# Patient Record
Sex: MALE | Race: WHITE | NOT HISPANIC OR LATINO | Employment: STUDENT | URBAN - METROPOLITAN AREA
[De-identification: names, ages, dates, MRNs, and addresses within clinical notes are randomized per-mention and may not be internally consistent; named-entity substitution may affect disease eponyms.]

---

## 2017-08-10 ENCOUNTER — ALLIED HEALTH/NURSE VISIT (OUTPATIENT)
Dept: FAMILY MEDICINE | Facility: CLINIC | Age: 32
End: 2017-08-10
Payer: COMMERCIAL

## 2017-08-10 DIAGNOSIS — Z23 ENCOUNTER FOR IMMUNIZATION: Primary | ICD-10-CM

## 2017-08-10 PROCEDURE — 90715 TDAP VACCINE 7 YRS/> IM: CPT

## 2017-08-10 PROCEDURE — 90471 IMMUNIZATION ADMIN: CPT

## 2017-08-10 PROCEDURE — 99207 ZZC NO CHARGE NURSE ONLY: CPT

## 2017-08-10 NOTE — NURSING NOTE
Prior to injection verified patient identity using patient's name and date of birth.  Screening Questionnaire for Adult Immunization    Are you sick today?   No   Do you have allergies to medications, food, a vaccine component or latex?   No   Have you ever had a serious reaction after receiving a vaccination?   No   Do you have a long-term health problem with heart disease, lung disease, asthma, kidney disease, metabolic disease (e.g. diabetes), anemia, or other blood disorder?   No   Do you have cancer, leukemia, HIV/AIDS, or any other immune system problem?   No   In the past 3 months, have you taken medications that affect  your immune system, such as prednisone, other steroids, or anticancer drugs; drugs for the treatment of rheumatoid arthritis, Crohn s disease, or psoriasis; or have you had radiation treatments?   No   Have you had a seizure, or a brain or other nervous system problem?   No   During the past year, have you received a transfusion of blood or blood     products, or been given immune (gamma) globulin or antiviral drug?   No   For women: Are you pregnant or is there a chance you could become        pregnant during the next month?   No   Have you received any vaccinations in the past 4 weeks?   No     Immunization questionnaire answers were all negative.      MNVFC doesn't apply on this patient     Patient instructed to remain in clinic for 15 minutes afterwards, and to report any adverse reaction to me immediately.       Screening performed by Mary Obregon on 8/10/2017 at 8:52 AM.

## 2017-08-10 NOTE — MR AVS SNAPSHOT
"              After Visit Summary   8/10/2017    Jeramie De Jesus    MRN: [id number]           Patient Information     Date Of Birth          1985        Visit Information        Provider Department      8/10/2017 8:30 AM FL GENI BERNABE/LPN [facility]        Today's Diagnoses     Encounter for immunization    -  1       Follow-ups after your visit        Who to contact     If you have questions or need follow up information about today's clinic visit or your schedule please contact [facility] directly at [phone].  Normal or non-critical lab and imaging results will be communicated to you by MyChart, letter or phone within 4 business days after the clinic has received the results. If you do not hear from us within 7 days, please contact the clinic through [employer] or phone. If you have a critical or abnormal lab result, we will notify you by phone as soon as possible.  Submit refill requests through [employer] or call your pharmacy and they will forward the refill request to us. Please allow 3 business days for your refill to be completed.          Additional Information About Your Visit        MyChart Information     [employer] lets you send messages to your doctor, view your test results, renew your prescriptions, schedule appointments and more. To sign up, go to [email or web address] . Click on \"Log in\" on the left side of the screen, which will take you to the Welcome page. Then click on \"Sign up Now\" on the right side of the page.     You will be asked to enter the access code listed below, as well as some personal information. Please follow the directions to create your username and password.     Your access code is: [email or web address]  Expires: 2017  9:00 AM     Your access code will  in 90 days. If you need help or a new code, please call your [facility] or [phone].        Care EveryWhere ID     This is your Care EveryWhere ID. This could be used by other " organizations to access your [facility] medical records  [id number]         Blood Pressure from Last 3 Encounters:   11/02/15 123/79   10/15/15 121/66   10/13/15 124/78    Weight from Last 3 Encounters:   11/02/15 171 lb 9.6 oz (77.8 kg)   10/15/15 165 lb (74.8 kg)   10/13/15 169 lb (76.7 kg)              We Performed the Following     ADMIN 1st VACCINE     TDAP VACCINE (ADACEL)        Primary Care Provider Office Phone # Fax #    Stacy Mckinley [phone] [address] [address]        Equal Access to Services     [facility] CenterAL[facility] : Hadii mari Ferrer, waaxda courtneyadaha, qaybta kaalmada andrew, willis walker . So [facility] [phone].    ATENCIÓN: Si habla español, tiene a pereira disposición servicios gratuitos de asistencia lingüística. Llame al [phone].    We comply with applicable federal civil rights laws and [place] laws. We do not discriminate on the basis of race, color, national origin, age, disability sex, sexual orientation or gender identity.            Thank you!     Thank you for choosing [facility]  for your care. Our goal is always to provide you with excellent care. Hearing back from our patients is one way we can continue to improve our services. Please take a few minutes to complete the written survey that you may receive in the mail after your visit with us. Thank you!             Your Updated Medication List - Protect others around you: Learn how to safely use, store and throw away your medicines at www.disposemymeds.org.          This list is accurate as of: 8/10/17  9:00 AM.  Always use your most recent med list.                   Brand Name Dispense Instructions for use Diagnosis    azithromycin 250 MG tablet    ZITHROMAX    6 tablet    Two tablets first day, then one tablet daily for four days.

## 2017-11-19 ENCOUNTER — OFFICE VISIT (OUTPATIENT)
Dept: URGENT CARE | Facility: URGENT CARE | Age: 32
End: 2017-11-19
Payer: OTHER MISCELLANEOUS

## 2017-11-19 VITALS
BODY MASS INDEX: 27.28 KG/M2 | OXYGEN SATURATION: 98 % | SYSTOLIC BLOOD PRESSURE: 127 MMHG | WEIGHT: 179.4 LBS | HEART RATE: 81 BPM | DIASTOLIC BLOOD PRESSURE: 67 MMHG | TEMPERATURE: 97.9 F

## 2017-11-19 DIAGNOSIS — T14.8XXA SUPERFICIAL FOREIGN BODY (SLIVER): Primary | ICD-10-CM

## 2017-11-19 PROCEDURE — 99213 OFFICE O/P EST LOW 20 MIN: CPT | Performed by: FAMILY MEDICINE

## 2017-11-19 RX ORDER — CEPHALEXIN 500 MG/1
500 CAPSULE ORAL 4 TIMES DAILY
Qty: 40 CAPSULE | Refills: 0 | Status: SHIPPED | OUTPATIENT
Start: 2017-11-19 | End: 2021-08-12

## 2017-11-19 NOTE — PROGRESS NOTES
SUBJECTIVE:   Jeramie De Jesus is a 31 year old male who presents to clinic today for the following health issues:      Sliver removal       Duration: this morning at 6:20 am     Description (location/character/radiation): sliver is on right calf     Intensity:  mild    Accompanying signs and symptoms: walked along side a plywood sheet and got too close, sliver is about an inch or so at least     History (similar episodes/previous evaluation): None    Precipitating or alleviating factors: None    Therapies tried and outcome: cleaned, tired to pull it out himself, antibiotic ointment and bandaid     Tetanus vaccination is up to date          Problem list and histories reviewed & adjusted, as indicated.  Additional history: as documented    There is no problem list on file for this patient.    History reviewed. No pertinent surgical history.    Social History   Substance Use Topics     Smoking status: Never Smoker     Smokeless tobacco: Never Used     Alcohol use Yes      Comment: Socially     Family History   Problem Relation Age of Onset     Hypertension Father          No current outpatient prescriptions on file.     No Known Allergies  No lab results found.   BP Readings from Last 3 Encounters:   11/19/17 127/67   11/02/15 123/79   10/15/15 121/66    Wt Readings from Last 3 Encounters:   11/19/17 179 lb 6.4 oz (81.4 kg)   11/02/15 171 lb 9.6 oz (77.8 kg)   10/15/15 165 lb (74.8 kg)                  Labs reviewed in EPIC          Reviewed and updated as needed this visit by clinical staffTobacco  Allergies  Meds  Med Hx  Surg Hx  Fam Hx  Soc Hx      Reviewed and updated as needed this visit by Provider         ROS:  Constitutional, HEENT, cardiovascular, pulmonary, gi and gu systems are negative, except as otherwise noted.      OBJECTIVE:   /67  Pulse 81  Temp 97.9  F (36.6  C) (Tympanic)  Wt 179 lb 6.4 oz (81.4 kg)  SpO2 98%  BMI 27.28 kg/m2  Body mass index is 27.28 kg/(m^2).  GENERAL: healthy,  alert and no distress  EYES: Eyes grossly normal to inspection, PERRL and conjunctivae and sclerae normal  NECK: no adenopathy, no asymmetry, masses, or scars and thyroid normal to palpation  RESP: lungs clear to auscultation - no rales, rhonchi or wheezes  CV: regular rate and rhythm, normal S1 S2, no S3 or S4, no murmur, click or rub, no peripheral edema and peripheral pulses strong  ABDOMEN: soft, nontender, no hepatosplenomegaly, no masses and bowel sounds normal  SKIN: pencil dot size skin ulceration involving right lateral calf as below, no foreign body identified, tender on palpation, no swelling or drainage noted, sliver forcep used for probing the ulceration, pulses 3+, sensation to touch and pressure intact             ASSESSMENT/PLAN:         ICD-10-CM    1. Superficial foreign body (sliver) T14.8XXA cephALEXin (KEFLEX) 500 MG capsule     ORTHOPEDICS ADULT REFERRAL     No foreign body identifiable, sliver forcep used for probing the ulceration. Suggested to use cephalexin prophylactically for infection prevention. Patient seems concern with the amount of pain which he is experiencing. Suggested to follow up with orthopedics if symptom persist or worsen. Instructed to keep the area dry and clean, used bacitracin topically as well. Work noted provided. Patient understood and in agreement with the above plan. All questions answered.         Trae Goodwin MD  [facility]

## 2017-11-19 NOTE — LETTER
November 19, 2017      Jeramie De Jesus  [address] [place] [address]        To Whom It May Concern:          Jeramie De Jesus was seen in our clinic. He may return to work without restrictions today.          Sincerely,              Trae Goodwin MD

## 2017-11-19 NOTE — MR AVS SNAPSHOT
After Visit Summary   11/19/2017    Jeramie De Jesus    MRN: [id number]           Patient Information     Date Of Birth          1985        Visit Information        Provider Department      11/19/2017 11:00 AM Trae Goodwin MD [facility]        Today's Diagnoses     Superficial foreign body (sliver)    -  1       Follow-ups after your visit        Additional Services     ORTHOPEDICS ADULT REFERRAL       Your provider has referred you to: [facility] [place] [phone]   [email or web address]    Please be aware that coverage of these services is subject to the terms and limitations of your health insurance plan.  Call member services at your health plan with any benefit or coverage questions.      Please bring the following to your appointment:    >>   Any x-rays, CTs or MRIs which have been performed.  Contact the facility where they were done to arrange for  prior to your scheduled appointment.    >>   List of current medications   >>   This referral request   >>   Any documents/labs given to you for this referral                  Who to contact     If you have questions or need follow up information about today's clinic visit or your schedule please contact [facility] directly at [phone].  Normal or non-critical lab and imaging results will be communicated to you by MyChart, letter or phone within 4 business days after the clinic has received the results. If you do not hear from us within 7 days, please contact the clinic through MyChart or phone. If you have a critical or abnormal lab result, we will notify you by phone as soon as possible.  Submit refill requests through [employer] or call your pharmacy and they will forward the refill request to us. Please allow 3 business days for your refill to be completed.          Additional Information About Your  "Visit        Prior Knowledge Information     Prior Knowledge lets you send messages to your doctor, view your test results, renew your prescriptions, schedule appointments and more. To sign up, go to [email or web address] . Click on \"Log in\" on the left side of the screen, which will take you to the Welcome page. Then click on \"Sign up Now\" on the right side of the page.     You will be asked to enter the access code listed below, as well as some personal information. Please follow the directions to create your username and password.     Your access code is: [email or web address]  Expires: 2018 11:47 AM     Your access code will  in 90 days. If you need help or a new code, please call your [facility] clinic or [phone].        Care EveryWhere ID     This is your Care EveryWhere ID. This could be used by other organizations to access your [facility] medical records  [id number]        Your Vitals Were     Pulse Temperature Pulse Oximetry BMI (Body Mass Index)          81 97.9  F (36.6  C) (Tympanic) 98% 27.28 kg/m2         Blood Pressure from Last 3 Encounters:   17 127/67   11/02/15 123/79   10/15/15 121/66    Weight from Last 3 Encounters:   17 179 lb 6.4 oz (81.4 kg)   11/02/15 171 lb 9.6 oz (77.8 kg)   10/15/15 165 lb (74.8 kg)              We Performed the Following     ORTHOPEDICS ADULT REFERRAL          Today's Medication Changes          These changes are accurate as of: 17 11:48 AM.  If you have any questions, ask your nurse or doctor.               Start taking these medicines.        Dose/Directions    cephALEXin 500 MG capsule   Commonly known as:  KEFLEX   Used for:  Superficial foreign body (sliver)   Started by:  Trae Goodwin MD        Dose:  500 mg   Take 1 capsule (500 mg) by mouth 4 times daily   Quantity:  40 capsule   Refills:  0            Where to get your medicines      These medications were sent to [id number] - [facility] [address] " [facility] [address]    Hours:  Closed 10-16-08 business to [facility] Phone:  [phone]     cephALEXin 500 MG capsule                Primary Care Provider Office Phone # Fax #    Stacy Mckinley [phone] [address] [facility] [address]        Equal Access to Services     JORDANA CONLEY : Hadii aad ku hadasho Soomaali, waaxda luqadaha, qaybta kaalmada adeegyada, waxay idiin hayaan adeeg kharash laJennyaan . So [facility] [phone].    ATENCIÓN: Si habla español, tiene a pereira disposición servicios gratuitos de asistencia lingüística. Llame al [phone].    We comply with applicable federal civil rights laws and [place] laws. We do not discriminate on the basis of race, color, national origin, age, disability, sex, sexual orientation, or gender identity.            Thank you!     Thank you for choosing [facility]  for your care. Our goal is always to provide you with excellent care. Hearing back from our patients is one way we can continue to improve our services. Please take a few minutes to complete the written survey that you may receive in the mail after your visit with us. Thank you!             Your Updated Medication List - Protect others around you: Learn how to safely use, store and throw away your medicines at www.disposemymeds.org.          This list is accurate as of: 11/19/17 11:48 AM.  Always use your most recent med list.                   Brand Name Dispense Instructions for use Diagnosis    cephALEXin 500 MG capsule    KEFLEX    40 capsule    Take 1 capsule (500 mg) by mouth 4 times daily    Superficial foreign body (sliver)

## 2017-12-07 ENCOUNTER — OFFICE VISIT (OUTPATIENT)
Dept: FAMILY MEDICINE | Facility: CLINIC | Age: 32
End: 2017-12-07
Payer: OTHER MISCELLANEOUS

## 2017-12-07 ENCOUNTER — OFFICE VISIT (OUTPATIENT)
Dept: ORTHOPEDICS | Facility: CLINIC | Age: 32
End: 2017-12-07
Payer: OTHER MISCELLANEOUS

## 2017-12-07 VITALS
TEMPERATURE: 97.5 F | HEART RATE: 80 BPM | HEIGHT: 69 IN | SYSTOLIC BLOOD PRESSURE: 117 MMHG | BODY MASS INDEX: 26.07 KG/M2 | WEIGHT: 176 LBS | DIASTOLIC BLOOD PRESSURE: 78 MMHG

## 2017-12-07 VITALS — RESPIRATION RATE: 18 BRPM | BODY MASS INDEX: 26.51 KG/M2 | WEIGHT: 179 LBS | HEIGHT: 69 IN | TEMPERATURE: 98.2 F

## 2017-12-07 DIAGNOSIS — M79.5 FOREIGN BODY (FB) IN SOFT TISSUE: Primary | ICD-10-CM

## 2017-12-07 PROCEDURE — 99243 OFF/OP CNSLTJ NEW/EST LOW 30: CPT | Performed by: ORTHOPAEDIC SURGERY

## 2017-12-07 PROCEDURE — 99213 OFFICE O/P EST LOW 20 MIN: CPT | Performed by: FAMILY MEDICINE

## 2017-12-07 NOTE — PROGRESS NOTES
SUBJECTIVE:   Jeramie De Jesus is a 31 year old male who presents to clinic today for the following health issues:  Work comp    Concern - sliver R leg calf   Onset: 11-19-17    Description:   Patient was walking between a pallet and some  Wood it was a very narrow space and got a sliver from the wood, was been seen but nothing was taking out from calf of leg     Intensity: moderate    Progression of Symptoms:  same    Accompanying Signs & Symptoms:  Sliver from a piece of wood     Previous history of similar problem:   None     Precipitating factors:   Worsened by: walking and up on his feet     Alleviating factors:  Improved by: none     Therapies Tried and outcome: Patient was seen on 11-19 put noting was seen       Patient is a 31 yr old male here for foreign body removal. This happened at work over two weeks ago and he was seen in  and there was an attempt to remove this . Pain is experiencing some pain in his calf. Patient denies any other symptoms.     Problem list and histories reviewed & adjusted, as indicated.  Additional history: as documented    There is no problem list on file for this patient.    History reviewed. No pertinent surgical history.    Social History   Substance Use Topics     Smoking status: Never Smoker     Smokeless tobacco: Never Used     Alcohol use Yes      Comment: Socially     Family History   Problem Relation Age of Onset     Hypertension Father          Current Outpatient Prescriptions   Medication Sig Dispense Refill     cephALEXin (KEFLEX) 500 MG capsule Take 1 capsule (500 mg) by mouth 4 times daily 40 capsule 0     No Known Allergies  BP Readings from Last 3 Encounters:   12/07/17 117/78   11/19/17 127/67   11/02/15 123/79    Wt Readings from Last 3 Encounters:   12/07/17 176 lb (79.8 kg)   11/19/17 179 lb 6.4 oz (81.4 kg)   11/02/15 171 lb 9.6 oz (77.8 kg)                  Labs reviewed in EPIC        Reviewed and updated as needed this visit by clinical staffTobacco   "Allergies  Med Hx  Surg Hx  Fam Hx  Soc Hx      Reviewed and updated as needed this visit by Provider         ROS:  Constitutional, HEENT, cardiovascular, pulmonary, gi and gu systems are negative, except as otherwise noted.      OBJECTIVE:   /78 (BP Location: Left arm, Cuff Size: Adult Regular)  Pulse 80  Temp 97.5  F (36.4  C) (Tympanic)  Ht 5' 9\" (1.753 m)  Wt 176 lb (79.8 kg)  BMI 25.99 kg/m2  Body mass index is 25.99 kg/(m^2).  GENERAL: healthy, alert and no distress  EYES: Eyes grossly normal to inspection, PERRL and conjunctivae and sclerae normal  HENT: ear canals and TM's normal, nose and mouth without ulcers or lesions  NECK: no adenopathy, no asymmetry, masses, or scars and thyroid normal to palpation  RESP: lungs clear to auscultation - no rales, rhonchi or wheezes  CV: regular rate and rhythm, normal S1 S2, no S3 or S4, no murmur, click or rub, no peripheral edema and peripheral pulses strong  MS: no gross musculoskeletal defects noted, no edema  SKIN: no suspicious lesions or rashes and foreign body in skin on calf, deep in soft tissue  .    Diagnostic Test Results:  none     ASSESSMENT/PLAN:         1. Foreign body (FB) in soft tissue  Patient referred to orthopedics for removal. Since foreign body is deep in the tissue don't feel comfortable removing it   - ORTHO  REFERRAL    FUTURE APPOINTMENTS:       - Follow-up visit as needed    Kayla Ye MD  [facility]  "

## 2017-12-07 NOTE — NURSING NOTE
"Chief Complaint   Patient presents with     Consult     Right calf sliver on 11/19/17 at work. Pain level 0/10.       Initial Temp 98.2  F (36.8  C)  Resp 18  Ht 1.753 m (5' 9\")  Wt 81.2 kg (179 lb)  BMI 26.43 kg/m2 Estimated body mass index is 26.43 kg/(m^2) as calculated from the following:    Height as of this encounter: 1.753 m (5' 9\").    Weight as of this encounter: 81.2 kg (179 lb).  Medication Reconciliation: complete   Anh Johnson MA      "

## 2017-12-07 NOTE — NURSING NOTE
"Chief Complaint   Patient presents with     Foreign Body in Skin     R leg back of calf        Initial /78 (BP Location: Left arm, Cuff Size: Adult Regular)  Pulse 80  Temp 97.5  F (36.4  C) (Tympanic)  Ht 5' 9\" (1.753 m)  Wt 176 lb (79.8 kg)  BMI 25.99 kg/m2 Estimated body mass index is 25.99 kg/(m^2) as calculated from the following:    Height as of this encounter: 5' 9\" (1.753 m).    Weight as of this encounter: 176 lb (79.8 kg).  Medication Reconciliation: complete  "

## 2017-12-07 NOTE — MR AVS SNAPSHOT
After Visit Summary   12/7/2017    Jeramie De Jesus    MRN: [id number]           Patient Information     Date Of Birth          1985        Visit Information        Provider Department      12/7/2017 11:00 AM Kayla Ye MD [facility]        Today's Diagnoses     Foreign body (FB) in soft tissue    -  1      Care Instructions          Thank you for choosing [facility].  You may be receiving a survey in the mail from [employer] regarding your visit today.  Please take a few minutes to complete and return the survey to let us know how we are doing.      If you have questions or concerns, please contact us via Ecommo or you can contact your care team at [phone].    Our Clinic hours are:  Monday 6:40 am  to 7:00 pm  Tuesday -Friday 6:40 am to 5:00 pm    The [place] outpatient lab hours are:  Monday - Friday 6:10 am to 4:45 pm  Saturdays 7:00 am to 11:00 am  Appointments are required, call [phone]    If you have clinical questions after hours or would like to schedule an appointment,  call the clinic at [phone].          Follow-ups after your visit        Additional Services     ORTHO  REFERRAL       [facility] is referring you to the Orthopedic  Services at [facility].       The  Representative will assist you in the coordination of your Orthopedic and Musculoskeletal Care as prescribed by your physician.    The  Representative will call you within 1 business day to help schedule your appointment, or you may contact the  Representative at:    All areas ~ [phone]     Type of Referral : Surgical / Specialist       Timeframe requested: 1 - 2 days    Coverage of these services is subject to the terms and limitations of your health insurance plan.  Please call member services at your health plan with any benefit or coverage questions.      If X-rays, CT or MRI's  "have been performed, please contact the facility where they were done to arrange for , prior to your scheduled appointment.  Please bring this referral request to your appointment and present it to your specialist.                  Your next 10 appointments already scheduled     Dec 07, 2017  2:45 PM CST   New Visit with Hugh Flores MD   [facility]    [address]  [place] [address] [phone]              Who to contact     If you have questions or need follow up information about today's clinic visit or your schedule please contact [facility] directly at [phone].  Normal or non-critical lab and imaging results will be communicated to you by MyChart, letter or phone within 4 business days after the clinic has received the results. If you do not hear from us within 7 days, please contact the clinic through MyChart or phone. If you have a critical or abnormal lab result, we will notify you by phone as soon as possible.  Submit refill requests through nDreams or call your pharmacy and they will forward the refill request to us. Please allow 3 business days for your refill to be completed.          Additional Information About Your Visit        [employer] Information     nDreams lets you send messages to your doctor, view your test results, renew your prescriptions, schedule appointments and more. To sign up, go to [email or web address] . Click on \"Log in\" on the left side of the screen, which will take you to the Welcome page. Then click on \"Sign up Now\" on the right side of the page.     You will be asked to enter the access code listed below, as well as some personal information. Please follow the directions to create your username and password.     Your access code is: [email or web address]  Expires: 2018 11:47 AM     Your access code will  in 90 days. If you need help or a new code, please call your [facility] or " "[phone].        Care EveryWhere ID     This is your Care EveryWhere ID. This could be used by other organizations to access your [place] medical records  [id number]        Your Vitals Were     Pulse Temperature Height BMI (Body Mass Index)          80 97.5  F (36.4  C) (Tympanic) 5' 9\" (1.753 m) 25.99 kg/m2         Blood Pressure from Last 3 Encounters:   12/07/17 117/78   11/19/17 127/67   11/02/15 123/79    Weight from Last 3 Encounters:   12/07/17 176 lb (79.8 kg)   11/19/17 179 lb 6.4 oz (81.4 kg)   11/02/15 171 lb 9.6 oz (77.8 kg)              We Performed the Following     ORTHO  REFERRAL        Primary Care Provider Office Phone # Fax #    Stacy Mckinley [phone] [address] [place] [address]        Equal Access to Services     JORDANA CONLEY : Hadii aad ku hadasho Soomaali, waaxda luqadaha, qaybta kaalmada adeegyada, waxay melissain hayolga walker . So [facility] [phone].    ATENCIÓN: Si habla español, tiene a pereira disposición servicios gratuitos de asistencia lingüística. [facility] [phone].    We comply with applicable federal civil rights laws and [place] laws. We do not discriminate on the basis of race, color, national origin, age, disability, sex, sexual orientation, or gender identity.            Thank you!     Thank you for choosing [facility]  for your care. Our goal is always to provide you with excellent care. Hearing back from our patients is one way we can continue to improve our services. Please take a few minutes to complete the written survey that you may receive in the mail after your visit with us. Thank you!             Your Updated Medication List - Protect others around you: Learn how to safely use, store and throw away your medicines at www.disposemymeds.org.          This list is accurate as of: 12/7/17 12:43 PM.  Always use your most recent med list.                   Brand Name Dispense Instructions for " use Diagnosis    cephALEXin 500 MG capsule    KEFLEX    40 capsule    Take 1 capsule (500 mg) by mouth 4 times daily    Superficial foreign body (sliver)

## 2017-12-07 NOTE — PATIENT INSTRUCTIONS
Thank you for choosing [facility].  You may be receiving a survey in the mail from Devan Kelsey regarding your visit today.  Please take a few minutes to complete and return the survey to let us know how we are doing.      If you have questions or concerns, please contact us via MugenUp or you can contact your care team at [phone].    Our Clinic hours are:  Monday 6:40 am  to 7:00 pm  Tuesday -Friday 6:40 am to 5:00 pm    The [place] outpatient lab hours are:  Monday - Friday 6:10 am to 4:45 pm  Saturdays 7:00 am to 11:00 am  Appointments are required, call [phone]    If you have clinical questions after hours or would like to schedule an appointment,  call the clinic at [phone].

## 2017-12-07 NOTE — LETTER
12/7/2017         RE: Jeramie De Jesus  [address] [address]        Dear Colleague,    Thank you for referring your patient, Jeramie De Jesus, to the [facility]. Please see a copy of my visit note below.    Jeramie De Jesus is a 31 year old male who is seen in consultation at the request of Dr Kayla Ye for foreign body in right calf.    History reviewed. No pertinent past medical history.    History reviewed. No pertinent surgical history.    Family History   Problem Relation Age of Onset     Hypertension Father        Social History     Social History     Marital status:      Spouse name: N/A     Number of children: N/A     Years of education: N/A     Occupational History     Not on file.     Social History Main Topics     Smoking status: Never Smoker     Smokeless tobacco: Never Used     Alcohol use Yes      Comment: Socially     Drug use: No     Sexual activity: Yes     Partners: Female     Other Topics Concern     Not on file     Social History Narrative       Current Outpatient Prescriptions   Medication Sig Dispense Refill     cephALEXin (KEFLEX) 500 MG capsule Take 1 capsule (500 mg) by mouth 4 times daily 40 capsule 0       No Known Allergies    REVIEW OF SYSTEMS:  CONSTITUTIONAL:  NEGATIVE for fever, chills, change in weight, not feeling tired  SKIN:  NEGATIVE for worrisome rashes, no skin lumps, no skin ulcers and no non-healing wounds  EYES:  NEGATIVE for vision changes or irritation.  ENT/MOUTH:  NEGATIVE.  No hearing loss, no hoarseness, no difficulty swallowing.  RESP:  NEGATIVE. No cough or shortness of breath.  CV:  NEGATIVE for chest pain, palpitations or peripheral edema  GI:  NEGATIVE for nausea, abdominal pain, heartburn, or change in bowel habits  :  Negative. No dysuria, no hematuria  MUSCULOSKELETAL:  See HPI above  NEURO:  NEGATIVE . No headaches, no dizziness,  no numbness  ENDOCRINE:  NEGATIVE for temperature intolerance, skin/hair  "changes  HEME/ALLERGY/IMMUNE:  NEGATIVE for bleeding problems  PSYCHIATRIC:  NEGATIVE. no anxiety, no depression.     Exam:  Vitals: Temp 98.2  F (36.8  C)  Resp 18  Ht 1.753 m (5' 9\")  Wt 81.2 kg (179 lb)  BMI 26.43 kg/m2  BMI= Body mass index is 26.43 kg/(m^2).  Constitutional:  healthy, alert and no distress  Neuro: Alert and Oriented x 3, Gait normal. Sensation grossly WNL.  Psych: Affect normal   Respiratory: Breathing not labored.  Cardiovascular: normal peripheral pulses  Lymph: no adenopathy  Skin: No rashes,worrisome lesions or skin problems      Again, thank you for allowing me to participate in the care of your patient.        Sincerely,        Hugh Flores MD    "

## 2017-12-07 NOTE — MR AVS SNAPSHOT
"              After Visit Summary   2017    Jeramie De Jesus    MRN: [id number]           Patient Information     Date Of Birth          1985        Visit Information        Provider Department      2017 2:45 PM Hugh Flores MD [facility] [place]        Today's Diagnoses     Foreign body (FB) in soft tissue    -  1       Follow-ups after your visit        Who to contact     If you have questions or need follow up information about today's clinic visit or your schedule please contact [facility] directly at [phone].  Normal or non-critical lab and imaging results will be communicated to you by Power Electronicshart, letter or phone within 4 business days after the clinic has received the results. If you do not hear from us within 7 days, please contact the clinic through Powerlinxt or phone. If you have a critical or abnormal lab result, we will notify you by phone as soon as possible.  Submit refill requests through [employer] Media or call your pharmacy and they will forward the refill request to us. Please allow 3 business days for your refill to be completed.          Additional Information About Your Visit        MyChart Information     [employer] lets you send messages to your doctor, view your test results, renew your prescriptions, schedule appointments and more. To sign up, go to [email or web address] . Click on \"Log in\" on the left side of the screen, which will take you to the Welcome page. Then click on \"Sign up Now\" on the right side of the page.     You will be asked to enter the access code listed below, as well as some personal information. Please follow the directions to create your username and password.     Your access code is: [email or web address]  Expires: 2018 11:47 AM     Your access code will  in 90 days. If you need help or a new code, please call your [facility] or [phone].        Care EveryWhere ID     This is your Care EveryWhere ID. This could be used by " "other organizations to access your [place] medical records  [id number]        Your Vitals Were     Temperature Respirations Height BMI (Body Mass Index)          98.2  F (36.8  C) 18 1.753 m (5' 9\") 26.43 kg/m2         Blood Pressure from Last 3 Encounters:   12/07/17 117/78   11/19/17 127/67   11/02/15 123/79    Weight from Last 3 Encounters:   12/07/17 81.2 kg (179 lb)   12/07/17 79.8 kg (176 lb)   11/19/17 81.4 kg (179 lb 6.4 oz)              Today, you had the following     No orders found for display       Primary Care Provider Office Phone # Fax #    Stacy Mckinley [phone] [address] [place] [address]        Equal Access to Services     [facility] : Hadii mari guerrero hadasho Somaggie, waaxda luqadaha, qaybta kaalmada andrew, willis walker . So [facility] [phone].    ATENCIÓN: Si habla español, tiene a pereira disposición servicios gratuitos de asistencia lingüística. [facility] al [phone].    We comply with applicable federal civil rights laws and [place] laws. We do not discriminate on the basis of race, color, national origin, age, disability, sex, sexual orientation, or gender identity.            Thank you!     Thank you for choosing [facility]  for your care. Our goal is always to provide you with excellent care. Hearing back from our patients is one way we can continue to improve our services. Please take a few minutes to complete the written survey that you may receive in the mail after your visit with us. Thank you!             Your Updated Medication List - Protect others around you: Learn how to safely use, store and throw away your medicines at www.disposemymeds.org.          This list is accurate as of: 12/7/17  8:45 PM.  Always use your most recent med list.                   Brand Name Dispense Instructions for use Diagnosis    cephALEXin 500 MG capsule    KEFLEX    40 capsule    Take 1 capsule (500 mg) by mouth 4 " times daily    Superficial foreign body (sliver)

## 2017-12-08 NOTE — PROGRESS NOTES
DATE OF VISIT:  2017.       HISTORY OF PRESENT ILLNESS:  Mr. Robbie De Jesus is a 31-year-old male referred from Dr. Kayla Ye for evaluation of foreign body in right calf.  He was injured at work on 2017 while walking between a pallet and some wood.  It was a very narrow space and he got a sliver of wood in the calf.  He pulled part of it out, but only part came out.  He was seen after that, but nothing was taken out of the leg.  He has had persistence of pain in the calf and pain when walking up on his feet.  If he bumps this it is quite sore.  He can feel a transverse foreign body in the superficial calf.  He has no pain unless it is bumped or with any activity with walking.      PHYSICAL EXAMINATION:  Shows a small puncture wound at the lateral aspect of the calf on the right at the posterolateral aspect.  Just posterior to this about a centimeter, there is a prominence of foreign body.  It appears to be a linear sliver oriented transversely and angled posteriorly and medially from the entrance point.  It may be about an inch to an inch and a half long.  I cannot feel the far edge of it at its depth.  Sensation and circulation are intact.  There is no surrounding erythema or drainage.      IMPRESSION:  Foreign body, right calf, work-related.       We discussed options and I have recommended surgical excision of this because of the tenderness.  We should be able to do this with a local block in clinic.  We would need work compensation approval first and then will plan excision with local block with anesthetic with epinephrine.  I explained there is a risk that not all of the foreign body would be removed.         RAEGAN ROSS MD             D: 2017 20:43   T: 2017 09:12   MT: DANIELITO      Name:     ROBBIE DE JESUS   MRN:      [id number]        Account:      [id number]   :      1985           Visit Date:   2017      Document: [id number]

## 2017-12-08 NOTE — PROGRESS NOTES
"Jeramie De Jesus is a 31 year old male who is seen in consultation at the request of Dr Kayla Ye for foreign body in right calf.    History reviewed. No pertinent past medical history.    History reviewed. No pertinent surgical history.    Family History   Problem Relation Age of Onset     Hypertension Father        Social History     Social History     Marital status:      Spouse name: N/A     Number of children: N/A     Years of education: N/A     Occupational History     Not on file.     Social History Main Topics     Smoking status: Never Smoker     Smokeless tobacco: Never Used     Alcohol use Yes      Comment: Socially     Drug use: No     Sexual activity: Yes     Partners: Female     Other Topics Concern     Not on file     Social History Narrative       Current Outpatient Prescriptions   Medication Sig Dispense Refill     cephALEXin (KEFLEX) 500 MG capsule Take 1 capsule (500 mg) by mouth 4 times daily 40 capsule 0       No Known Allergies    REVIEW OF SYSTEMS:  CONSTITUTIONAL:  NEGATIVE for fever, chills, change in weight, not feeling tired  SKIN:  NEGATIVE for worrisome rashes, no skin lumps, no skin ulcers and no non-healing wounds  EYES:  NEGATIVE for vision changes or irritation.  ENT/MOUTH:  NEGATIVE.  No hearing loss, no hoarseness, no difficulty swallowing.  RESP:  NEGATIVE. No cough or shortness of breath.  CV:  NEGATIVE for chest pain, palpitations or peripheral edema  GI:  NEGATIVE for nausea, abdominal pain, heartburn, or change in bowel habits  :  Negative. No dysuria, no hematuria  MUSCULOSKELETAL:  See HPI above  NEURO:  NEGATIVE . No headaches, no dizziness,  no numbness  ENDOCRINE:  NEGATIVE for temperature intolerance, skin/hair changes  HEME/ALLERGY/IMMUNE:  NEGATIVE for bleeding problems  PSYCHIATRIC:  NEGATIVE. no anxiety, no depression.     Exam:  Vitals: Temp 98.2  F (36.8  C)  Resp 18  Ht 1.753 m (5' 9\")  Wt 81.2 kg (179 lb)  BMI 26.43 kg/m2  BMI= Body mass index " is 26.43 kg/(m^2).  Constitutional:  healthy, alert and no distress  Neuro: Alert and Oriented x 3, Gait normal. Sensation grossly WNL.  Psych: Affect normal   Respiratory: Breathing not labored.  Cardiovascular: normal peripheral pulses  Lymph: no adenopathy  Skin: No rashes,worrisome lesions or skin problems

## 2017-12-22 ENCOUNTER — TELEPHONE (OUTPATIENT)
Dept: ORTHOPEDICS | Facility: CLINIC | Age: 32
End: 2017-12-22

## 2017-12-22 NOTE — TELEPHONE ENCOUNTER
Tried calling Jeramie to inform him that his procedure has been approved by workers compensation but was unable to reach him and I did not leave a message. I will try calling him again when I return to clinic on 1/2/18. If he calls back before that time he may schedule this at the [facility] at 4:00PM on the following dates: 1/8/18, 1/15/18 or 1/18/18 (3:45PM).    MARLENE MaynardC  Supervising physician: Hugh Flores MD  Dept. of Orthopedics  [facility]

## 2018-01-02 ENCOUNTER — DOCUMENTATION ONLY (OUTPATIENT)
Dept: ORTHOPEDICS | Facility: CLINIC | Age: 33
End: 2018-01-02

## 2018-01-02 NOTE — PROGRESS NOTES
Called and left message with patient apologizing for the mix up, we were very clear in the last message to the patient and still someone scheduled him wrong, so I let him know we cannot preform his procedure at 10:30 on 1/8/17 in [place] that it has to be done after clinic hours so I told him to show up at 3:45 same day in [place]. Patient is very difficult to reach.    Anh Johnson MA

## 2018-01-08 ENCOUNTER — OFFICE VISIT (OUTPATIENT)
Dept: ORTHOPEDICS | Facility: CLINIC | Age: 33
End: 2018-01-08
Payer: OTHER MISCELLANEOUS

## 2018-01-08 VITALS — RESPIRATION RATE: 13 BRPM | WEIGHT: 181 LBS | BODY MASS INDEX: 27.43 KG/M2 | HEIGHT: 68 IN

## 2018-01-08 DIAGNOSIS — M79.5 FOREIGN BODY (FB) IN SOFT TISSUE: Primary | ICD-10-CM

## 2018-01-08 PROCEDURE — 10120 INC&RMVL FB SUBQ TISS SMPL: CPT | Performed by: ORTHOPAEDIC SURGERY

## 2018-01-08 NOTE — PATIENT INSTRUCTIONS
- Keep the leg clean and dry until we remove the stitches in clinic (10-14 days).  If the dressing gets a bit wet, it should be fine.  If the entire dressing gets soaked, remove it and cover it up with a new, dry dressing as soon as you are able.       - You may remove the bulky dressing/gauze in 2-3 days, then cover the stitches with a band-aid to keep them clean and dry.    - Make an appointment for 10-14 days after the procedure for stitch removal.  You may make this with the  staff or call [phone] to make the follow up appointment.  We can remove the stitches at any one of the three clinics Dr. Hugh Flores visits during the week.    - If you have symptoms of redness around the wound, drainage, increased warmth, fevers, chills or suspect infection call Dr. Hugh lFores's office.  Infections are rare, but do occur and usually are able to be treated with an antibiotic called in to your pharmacy.    - Call [phone] with any questions or concerns, please remember we are NOT in clinic Wednesday or Friday and may be unavailable these two days.

## 2018-01-08 NOTE — MR AVS SNAPSHOT
After Visit Summary   1/8/2018    Jeramie De Jesus    MRN: [id number]           Patient Information     Date Of Birth          1985        Visit Information        Provider Department      1/8/2018 10:30 AM Hugh Flores MD [facility] [place]        Care Instructions    - Keep the leg clean and dry until we remove the stitches in clinic (10-14 days).  If the dressing gets a bit wet, it should be fine.  If the entire dressing gets soaked, remove it and cover it up with a new, dry dressing as soon as you are able.       - You may remove the bulky dressing/gauze in 2-3 days, then cover the stitches with a band-aid to keep them clean and dry.    - Make an appointment for 10-14 days after the procedure for stitch removal.  You may make this with the  staff or call [phone] to make the follow up appointment.  We can remove the stitches at any one of the three clinics Dr. Hugh Flores visits during the week.    - If you have symptoms of redness around the wound, drainage, increased warmth, fevers, chills or suspect infection call Dr. Hugh Flores's office.  Infections are rare, but do occur and usually are able to be treated with an antibiotic called in to your pharmacy.    - Call [phone] with any questions or concerns, please remember we are NOT in clinic Wednesday or Friday and may be unavailable these two days.            Follow-ups after your visit        Your next 10 appointments already scheduled     Jan 18, 2018  2:00 PM CST   Return Visit with Hugh Flores MD   [facility] [place] ([facility] [place]    [address]  [place] [id number] [phone]              Who to contact     If you have questions or need follow up information about today's clinic visit or your schedule please contact [facility] [place] directly at [phone].  Normal or non-critical lab and imaging results will be communicated to you by  "MyChart, letter or phone within 4 business days after the clinic has received the results. If you do not hear from us within 7 days, please contact the clinic through [employer] or phone. If you have a critical or abnormal lab result, we will notify you by phone as soon as possible.  Submit refill requests through [employer] or call your pharmacy and they will forward the refill request to us. Please allow 3 business days for your refill to be completed.          Additional Information About Your Visit        [employer]Ohloh Information     [employer] lets you send messages to your doctor, view your test results, renew your prescriptions, schedule appointments and more. To sign up, go to [email or web address] . Click on \"Log in\" on the left side of the screen, which will take you to the Welcome page. Then click on \"Sign up Now\" on the right side of the page.     You will be asked to enter the access code listed below, as well as some personal information. Please follow the directions to create your username and password.     Your access code is: [email or web address]  Expires: 2018 11:47 AM     Your access code will  in 90 days. If you need help or a new code, please call your [place] clinic or [phone].        Care EveryWhere ID     This is your Care EveryWhere ID. This could be used by other organizations to access your [place] medical records  [id number]        Your Vitals Were     Respirations Height BMI (Body Mass Index)             13 1.727 m (5' 8\") 27.52 kg/m2          Blood Pressure from Last 3 Encounters:   17 117/78   17 127/67   11/02/15 123/79    Weight from Last 3 Encounters:   18 82.1 kg (181 lb)   17 81.2 kg (179 lb)   17 79.8 kg (176 lb)              Today, you had the following     No orders found for display       Primary Care Provider Office Phone # Fax #    Stcay Mckinley [phone] [address] [place] [address]        Equal Access " to Services     JORDANA CONLEY : Harini Ferrer, walaquita mata, qawillis oneil. So [facility] [phone].    ATENCIÓN: Si habla español, tiene a pereira disposición servicios gratuitos de asistencia lingüística. Llame al [phone].    We comply with applicable federal civil rights laws and [place] laws. We do not discriminate on the basis of race, color, national origin, age, disability, sex, sexual orientation, or gender identity.            Thank you!     Thank you for choosing [facility] [place]  for your care. Our goal is always to provide you with excellent care. Hearing back from our patients is one way we can continue to improve our services. Please take a few minutes to complete the written survey that you may receive in the mail after your visit with us. Thank you!             Your Updated Medication List - Protect others around you: Learn how to safely use, store and throw away your medicines at www.disposemymeds.org.          This list is accurate as of: 1/8/18  4:26 PM.  Always use your most recent med list.                   Brand Name Dispense Instructions for use Diagnosis    cephALEXin 500 MG capsule    KEFLEX    40 capsule    Take 1 capsule (500 mg) by mouth 4 times daily    Superficial foreign body (sliver)

## 2018-01-08 NOTE — PROGRESS NOTES
Procedure Note: right calf foreign body removal.  Work related.   Informed consent was obtained.  The patient was placed prone on the table. The right leg was then prepped and draped in sterile fashion.  1% lidocaine was injected at the posterior calf over the palpable foreign body.   Transverse incision was made at the lateral foreign body end, carried into subcutaneous tissue.  The foreign body was encountered and was removed from within its pseudocapsule.  I probed within the pseudocapsule for any other pieces of foreign body, but found none.   Wound was then irrigated.  Skin edges closed with interrupted 5-0 nylon suture.   Sterile dressings applied.  He is discharged on Tylenol no. 3, quantity 15.  RTC in 10 days for suture removal.    Hugh Flores M.D.  Department of Orthopedic Surgery

## 2018-01-08 NOTE — LETTER
1/8/2018         RE: Jeramie De Jesus  [address] [address]        Dear Colleague,    Thank you for referring your patient, Jeramie De Jesus, to the [facility]. Please see a copy of my visit note below.    Procedure Note: right calf foreign body removal.  Work related.   Informed consent was obtained.  The patient was placed prone on the table. The right leg was then prepped and draped in sterile fashion.  1% lidocaine was injected at the posterior calf over the palpable foreign body.   Transverse incision was made at the lateral foreign body end, carried into subcutaneous tissue.  The foreign body was encountered and was removed from within its pseudocapsule.  I probed within the pseudocapsule for any other pieces of foreign body, but found none.   Wound was then irrigated.  Skin edges closed with interrupted 5-0 nylon suture.   Sterile dressings applied.  He is discharged on Tylenol no. 3, quantity 15.  RTC in 10 days for suture removal.    Hugh Flores M.D.  Department of Orthopedic Surgery       Again, thank you for allowing me to participate in the care of your patient.        Sincerely,        Hugh Flores MD

## 2018-01-08 NOTE — NURSING NOTE
"Chief Complaint   Patient presents with     Minor Procedure     Presents for right calf foreign body excision. Workers compensation, Date of injury 11/19/17.       Initial Resp 13  Ht 1.727 m (5' 8\")  Wt 82.1 kg (181 lb)  BMI 27.52 kg/m2 Estimated body mass index is 27.52 kg/(m^2) as calculated from the following:    Height as of this encounter: 1.727 m (5' 8\").    Weight as of this encounter: 82.1 kg (181 lb).  Medication Reconciliation: complete     MARLENE MaynardC  Supervising physician: Hugh Flores MD  Dept. of Orthopedics  [facility]          "

## 2018-01-09 NOTE — PROGRESS NOTES
Room was opened in the typical sterile fashion and all supplies checked for proper expiration date and integrity of packaging. Patient was then brought to procedure room and the consent was reviewed to ensure proper site and laterality. Patient read, understood and signed consent for the procedure. Patient was positioned prone with right leg exposed. Allergies reviewed and patient's procedure site and leg were scrubbed with a povidone iodine sponge from the midfoot to the popliteal space. After this the patient's leg was patted dry with a sterile towel. The patient's procedure site and leg from the midfoot to the popliteal space were then prepped with betadine solution twice. An additional confirmation of site and laterality was performed before local block was performed with 1% lidocaine with epinephrine. Dr. Hugh Flores was notified that the patient was ready for the procedure to begin at this time. All questions were answered to the patient's satisfaction.    Archie Lin PA-C  Supervising physician: Hugh Flores MD  Dept. of Orthopedics  [facility]

## 2018-01-18 ENCOUNTER — OFFICE VISIT (OUTPATIENT)
Dept: ORTHOPEDICS | Facility: CLINIC | Age: 33
End: 2018-01-18
Payer: OTHER MISCELLANEOUS

## 2018-01-18 VITALS — WEIGHT: 177 LBS | RESPIRATION RATE: 18 BRPM | BODY MASS INDEX: 26.83 KG/M2 | HEIGHT: 68 IN | TEMPERATURE: 98 F

## 2018-01-18 DIAGNOSIS — M79.5 FOREIGN BODY (FB) IN SOFT TISSUE: Primary | ICD-10-CM

## 2018-01-18 PROCEDURE — 99024 POSTOP FOLLOW-UP VISIT: CPT | Performed by: ORTHOPAEDIC SURGERY

## 2018-01-18 NOTE — PROGRESS NOTES
Follow up foreign body excision right calf on 1/8/18.  Wound is healing well.  Sutures are removed.  No tenderness at wound.  It appears all of foreign body has been removed.    Scar massage advised.  Resume activity as tolerated.  Return to work regular duty.  Return to clinic as needed.

## 2018-01-18 NOTE — LETTER
WORKABILITY    [facility], Dr. Hugh Flores M.D.  [facility]        1/18/2018      RE: Jeramie De Jesus    [address] [address]        To whom it may concern:     Jeramie De Jesus is under my care for right calf foreign body removal    Work related injury: Yes Date of injury: 1/8/18     No restrictions    Next appointment: As needed            Hugh Flores M.D.

## 2018-01-18 NOTE — PATIENT INSTRUCTIONS
Scar massage advised.  Resume activity as tolerated.  Return to work regular duty.  Return to clinic as needed.    Please remember to call and schedule a follow up appointment if one was recommended at your earliest convenience.  Orthopedics CLINIC HOURS TELEPHONE NUMBER   Dr. Mark Lin PA-C  Physician Assistant      Anh  Medical Assistant   Mondays- 8:00 - 4:00  [facility]    Tuesdays- 8:00-4:00  [facility] in the morning and [facility] in the afternoon   Thursdays- 8:00-4:00  [facility] in the morning and [facility] in the afternoon  Specialty schedulers:   [phone] to make an appointment with any Specialty Provider.   Main Clinic:   [phone] to make an appointment with your primary provider   Urgent Care locations:    [facility]   Monday-Friday Closed  Saturday-Sunday 9 am-5pm    Monday-Friday 12 pm - 8 pm  Saturday-Sunday 9 am-5 pm   [phone]     If SURGERY has been recommended, please call our Specialty Schedulers at [phone] to schedule your procedure.    If you need a medication refill, please contact your pharmacy. Please allow 3 business days for your refill to be completed.  Use [employer] (secure e-mail communication and access to your chart) to send a message or to make an appointment. Please ask how you can sign up for [employer].

## 2018-01-18 NOTE — LETTER
1/18/2018         RE: Jeramie De Jesus  [address] [address]        Dear Colleague,    Thank you for referring your patient, Jeramie De Jesus, to the [facility]. Please see a copy of my visit note below.    Follow up foreign body excision right calf on 1/8/18.  Wound is healing well.  Sutures are removed.  No tenderness at wound.  It appears all of foreign body has been removed.    Scar massage advised.  Resume activity as tolerated.  Return to work regular duty.  Return to clinic as needed.    Again, thank you for allowing me to participate in the care of your patient.        Sincerely,        Hugh Flores MD

## 2018-01-18 NOTE — MR AVS SNAPSHOT
After Visit Summary   1/18/2018    Jeramie De Jesus    MRN: [id number]           Patient Information     Date Of Birth          1985        Visit Information        Provider Department      1/18/2018 2:00 PM Hugh Flores MD [facility]        Care Instructions    Scar massage advised.  Resume activity as tolerated.  Return to work regular duty.  Return to clinic as needed.    Please remember to call and schedule a follow up appointment if one was recommended at your earliest convenience.  Orthopedics CLINIC HOURS TELEPHONE NUMBER   Dr. Mark Lin PA-C  Physician Assistant      Anh  Medical Assistant   Mondays- 8:00 - 4:00  [facility]    Tuesdays- 8:00-4:00  [facility] in the morning and [facility] in the afternoon   Thursdays- 8:00-4:00  [facility] in the morning and [facility] in the afternoon  Specialty schedulers:   [phone] to make an appointment with any Specialty Provider.   Main Clinic:   [phone] to make an appointment with your primary provider   Urgent Care locations:    [facility]   Monday-Friday Closed  Saturday-Sunday 9 am-5pm    Monday-Friday 12 pm - 8 pm  Saturday-Sunday 9 am-5 pm   [phone]     If SURGERY has been recommended, please call our Specialty Schedulers at [phone] to schedule your procedure.    If you need a medication refill, please contact your pharmacy. Please allow 3 business days for your refill to be completed.  Use [employer] (secure e-mail communication and access to your chart) to send a message or to make an appointment. Please ask how you can sign up for [employer].            Follow-ups after your visit        Who to contact     If you have questions or need follow up information about today's clinic visit or your schedule please contact [facility] directly at  "[phone].  Normal or non-critical lab and imaging results will be communicated to you by MyChart, letter or phone within 4 business days after the clinic has received the results. If you do not hear from us within 7 days, please contact the clinic through [facility] or phone. If you have a critical or abnormal lab result, we will notify you by phone as soon as possible.  Submit refill requests through [employer] or call your pharmacy and they will forward the refill request to us. Please allow 3 business days for your refill to be completed.          Additional Information About Your Visit        [facility] Information     [employer] lets you send messages to your doctor, view your test results, renew your prescriptions, schedule appointments and more. To sign up, go to [email or web address] . Click on \"Log in\" on the left side of the screen, which will take you to the Welcome page. Then click on \"Sign up Now\" on the right side of the page.     You will be asked to enter the access code listed below, as well as some personal information. Please follow the directions to create your username and password.     Your access code is: [email or web address]  Expires: 2018 11:47 AM     Your access code will  in 90 days. If you need help or a new code, please call your [place] clinic or [phone].        Care EveryWhere ID     This is your Care EveryWhere ID. This could be used by other organizations to access your [place] medical records  [id number]        Your Vitals Were     Temperature Respirations Height BMI (Body Mass Index)          98  F (36.7  C) 18 1.727 m (5' 8\") 26.91 kg/m2         Blood Pressure from Last 3 Encounters:   17 117/78   17 127/67   11/02/15 123/79    Weight from Last 3 Encounters:   18 80.3 kg (177 lb)   18 82.1 kg (181 lb)   17 81.2 kg (179 lb)              Today, you had the following     No orders found for display       Primary Care Provider Office Phone # Fax # "    Stacy Mckinley [phone]       [address] [place] [address]        Equal Access to Services     JORDANA CONLEY : Harini mari guerrero ledy Fererr, willi courtneyalbino, kelli morales, willis de diosfrandy taran. So [facility] [phone].    ATENCIÓN: Si habla español, tiene a pereira disposición servicios gratuitos de asistencia lingüística. Llame al [phone].    We comply with applicable federal civil rights laws and [place] laws. We do not discriminate on the basis of race, color, national origin, age, disability, sex, sexual orientation, or gender identity.            Thank you!     Thank you for choosing [facility]  for your care. Our goal is always to provide you with excellent care. Hearing back from our patients is one way we can continue to improve our services. Please take a few minutes to complete the written survey that you may receive in the mail after your visit with us. Thank you!             Your Updated Medication List - Protect others around you: Learn how to safely use, store and throw away your medicines at www.disposemymeds.org.          This list is accurate as of: 1/18/18  2:15 PM.  Always use your most recent med list.                   Brand Name Dispense Instructions for use Diagnosis    acetaminophen-codeine 300-30 MG per tablet    TYLENOL #3    15 tablet    Take 1-2 tablets by mouth every 4 hours as needed for moderate pain    Foreign body (FB) in soft tissue       cephALEXin 500 MG capsule    KEFLEX    40 capsule    Take 1 capsule (500 mg) by mouth 4 times daily    Superficial foreign body (sliver)

## 2018-01-18 NOTE — NURSING NOTE
"Chief Complaint   Patient presents with     RECHECK     Right calf foreign body removal on 1/8/18.       Initial Temp 98  F (36.7  C)  Resp 18  Ht 1.727 m (5' 8\")  Wt 80.3 kg (177 lb)  BMI 26.91 kg/m2 Estimated body mass index is 26.91 kg/(m^2) as calculated from the following:    Height as of this encounter: 1.727 m (5' 8\").    Weight as of this encounter: 80.3 kg (177 lb).  Medication Reconciliation: complete   Anh Johnson MA      "

## 2019-09-06 DIAGNOSIS — Z31.41 FERTILITY TESTING: Primary | ICD-10-CM

## 2020-09-29 DIAGNOSIS — Z31.41 FERTILITY TESTING: Primary | ICD-10-CM

## 2020-11-17 DIAGNOSIS — Z31.41 FERTILITY TESTING: ICD-10-CM

## 2020-11-17 PROCEDURE — 89322 SEMEN ANAL STRICT CRITERIA: CPT

## 2020-11-18 LAB
ABNORMAL SPERM: 95 MORPHOLOGY
ABSTINENCE DAYS: 2 DAYS (ref 2–7)
AGGLUTINATION: NO YES/NO
ANALYSIS TEMP - CENTIGRADE: 23.5 CENTIGRADE
CELL FRAGMENTS: NORMAL %
COLLECTION METHOD: NORMAL
COLLECTION SITE: NORMAL
CONSENT TO RELEASE TO PARTNER: YES
HEAD DEFECT: 95
IMMATURE SPERM: NORMAL %
IMMOTILE: 22 %
LAB RECEIPT TIME: NORMAL
LIQUEFIED: YES YES/NO
MIDPIECE DEFECT: 48
NON-PROGRESSIVE MOTILITY: 7 %
NORMAL SPERM: 5 % NORMAL FORMS (ref 4–?)
PROGRESSIVE MOTILITY: 71 % (ref 32–?)
ROUND CELLS: 0.9 MILLION/ML (ref ?–2)
SPECIMEN CONCENTRATION: 115 MILLION/ML (ref 15–?)
SPECIMEN PH: 7.6 PH (ref 7.2–?)
SPECIMEN TYPE: NORMAL
SPECIMEN VOL UR: 2.1 ML (ref 1.5–?)
TAIL DEFECT: 2
TIME OF ANALYSIS: NORMAL
TOTAL NUMBER: 242 MILLION (ref 39–?)
TOTAL PROGRESSIVE MOTILE: 172 MILLION (ref 15.6–?)
VISCOUS: NO YES/NO
VITALITY: NORMAL % (ref 58–?)
WBC SPECIMEN: NORMAL %

## 2020-11-19 NOTE — RESULT ENCOUNTER NOTE
Will forward to [facility] pool for pt notification of normal result.    Fe Taylor   Ob/Gyn Clinic  RN

## 2021-01-15 ENCOUNTER — HEALTH MAINTENANCE LETTER (OUTPATIENT)
Age: 36
End: 2021-01-15

## 2021-08-12 ENCOUNTER — OFFICE VISIT (OUTPATIENT)
Dept: FAMILY MEDICINE | Facility: CLINIC | Age: 36
End: 2021-08-12
Payer: COMMERCIAL

## 2021-08-12 VITALS
WEIGHT: 194.4 LBS | OXYGEN SATURATION: 97 % | HEIGHT: 68 IN | SYSTOLIC BLOOD PRESSURE: 124 MMHG | TEMPERATURE: 98.1 F | DIASTOLIC BLOOD PRESSURE: 74 MMHG | RESPIRATION RATE: 18 BRPM | BODY MASS INDEX: 29.46 KG/M2 | HEART RATE: 79 BPM

## 2021-08-12 DIAGNOSIS — L98.9 SKIN LESION: Primary | ICD-10-CM

## 2021-08-12 PROCEDURE — 99203 OFFICE O/P NEW LOW 30 MIN: CPT | Performed by: FAMILY MEDICINE

## 2021-08-12 RX ORDER — FLUOCINONIDE 0.5 MG/G
CREAM TOPICAL 2 TIMES DAILY
Qty: 60 G | Refills: 1 | Status: SHIPPED | OUTPATIENT
Start: 2021-08-12 | End: 2021-08-22

## 2021-08-12 ASSESSMENT — MIFFLIN-ST. JEOR: SCORE: 1793.35
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
## 2021-09-18 ENCOUNTER — HEALTH MAINTENANCE LETTER (OUTPATIENT)
Age: 36
End: 2021-09-18

## 2021-10-04 ENCOUNTER — OFFICE VISIT (OUTPATIENT)
Dept: DERMATOLOGY | Facility: CLINIC | Age: 36
End: 2021-10-04
Payer: COMMERCIAL

## 2021-10-04 VITALS
BODY MASS INDEX: 29.56 KG/M2 | HEART RATE: 69 BPM | SYSTOLIC BLOOD PRESSURE: 129 MMHG | HEIGHT: 68 IN | DIASTOLIC BLOOD PRESSURE: 83 MMHG

## 2021-10-04 DIAGNOSIS — D23.9 DERMATOFIBROMA: Primary | ICD-10-CM

## 2021-10-04 PROCEDURE — 99202 OFFICE O/P NEW SF 15 MIN: CPT | Performed by: PHYSICIAN ASSISTANT
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
## 2022-03-05 ENCOUNTER — HEALTH MAINTENANCE LETTER (OUTPATIENT)
Age: 37
End: 2022-03-05

## 2022-11-20 ENCOUNTER — HEALTH MAINTENANCE LETTER (OUTPATIENT)
Age: 37
End: 2022-11-20

## 2022-11-24 ENCOUNTER — E-VISIT (OUTPATIENT)
Dept: URGENT CARE | Facility: CLINIC | Age: 37
End: 2022-11-24
Payer: COMMERCIAL

## 2022-11-24 DIAGNOSIS — R21 RASH AND NONSPECIFIC SKIN ERUPTION: Primary | ICD-10-CM

## 2022-11-24 PROCEDURE — 99207 PR NON-BILLABLE SERV PER CHARTING: CPT | Performed by: FAMILY MEDICINE

## 2022-11-24 NOTE — PATIENT INSTRUCTIONS
Dear Jeramie De Jesus,    We are sorry you are not feeling well. Based on the responses you provided, it is recommended that you be seen in-person in urgent care so we can better evaluate your symptoms. Please click here to find the nearest urgent care location to you.   You will not be charged for this Visit. Thank you for trusting us with your care.    Hard to tell with the picture - best to be seen today in case shingles.    Adia Walker MD

## 2022-11-25 ENCOUNTER — HOSPITAL ENCOUNTER (EMERGENCY)
Facility: CLINIC | Age: 37
Discharge: HOME | End: 2022-11-25
Attending: PHYSICIAN ASSISTANT | Admitting: PHYSICIAN ASSISTANT
Payer: COMMERCIAL

## 2022-11-25 VITALS
DIASTOLIC BLOOD PRESSURE: 77 MMHG | TEMPERATURE: 98 F | RESPIRATION RATE: 10 BRPM | HEART RATE: 80 BPM | OXYGEN SATURATION: 99 % | SYSTOLIC BLOOD PRESSURE: 112 MMHG

## 2022-11-25 DIAGNOSIS — B02.9 SHINGLES: ICD-10-CM

## 2022-11-25 PROCEDURE — 99213 OFFICE O/P EST LOW 20 MIN: CPT | Performed by: PHYSICIAN ASSISTANT

## 2022-11-25 PROCEDURE — G0463 HOSPITAL OUTPT CLINIC VISIT: HCPCS | Performed by: PHYSICIAN ASSISTANT

## 2022-11-25 RX ORDER — VALACYCLOVIR HYDROCHLORIDE 1 G/1
1000 TABLET, FILM COATED ORAL 3 TIMES DAILY
Qty: 21 TABLET | Refills: 0 | Status: SHIPPED | OUTPATIENT
Start: 2022-11-25 | End: 2022-12-02

## 2022-11-25 ASSESSMENT — ENCOUNTER SYMPTOMS
MUSCULOSKELETAL NEGATIVE: 1
NEUROLOGICAL NEGATIVE: 1
CONSTITUTIONAL NEGATIVE: 1
RESPIRATORY NEGATIVE: 1

## 2022-11-25 ASSESSMENT — ACTIVITIES OF DAILY LIVING (ADL): ADLS_ACUITY_SCORE: 35

## 2022-11-25 NOTE — ED TRIAGE NOTES
Rash on back     Triage Assessment     Row Name 11/25/22 1230       Triage Assessment (Adult)    Airway WDL WDL       Skin Circulation/Temperature WDL    Skin Circulation/Temperature WDL X       Cognitive/Neuro/Behavioral WDL    Cognitive/Neuro/Behavioral WDL WDL

## 2022-11-25 NOTE — DISCHARGE INSTRUCTIONS
Recommend presenting to clinic for urgent medical evaluation if you note that the lesions are worsening or spreading, if you note lesions on your face, especially around the eyes or ears.  Recommend practicing good handwashing, especially after touching the lesions.  May use Dove or Aveeno body wash.  Recommend lukewarm showers and pat yourself dry to avoid rubbing the lesions.  Make sure you launder your own clothes and bath towels as these can spread the virus to others. May use topical gels such as hydrocortisone 1% cream and calamine lotion for itch relief.  May also take his over-the-counter Zyrtec once daily for relief of itching.  Avoid scratching the lesions, avoid tight fitting close.  Leave the affected areas open to air is much as possible.  Present to the emergency department if you develop a fever of 102  F or higher, increased redness/swelling or yellowish discharge from the lesions or severely worsening pain.    May apply ice or heat to affected areas for 15 to 20-minute intervals for symptomatic relief of pain and discomfort.  Commend ibuprofen and/or Tylenol for symptomatic relief of pain as needed.

## 2022-11-25 NOTE — ED PROVIDER NOTES
History     Chief Complaint   Patient presents with     Rash     HPI  Jeramie De Jesus is a 36 year old male nonconcerning past medical history who presents with a rash on his low back, left of center as well as on the lateral aspect of the left thigh which began 2 days ago.  States that he has dull pain on occasion with the rash, especially felt when he moves or stands up.  He especially notices itchiness with the rash.  He has not applied any topical medications for symptomatic relief.  No concerns of breathing or swallowing currently.  No chest pain or shortness of breath, no URI symptoms currently.  Normal bowel and bladder function normal food and fluid intake.  No rashes affecting the eyes, face or ears.  States that he has been under considerably more stress lately over the past week but denies any previous infection of shingles.  No antibiotics or infections over the past 60 days.      Allergies:  No Known Allergies    Problem List:    Patient Active Problem List    Diagnosis Date Noted     Foreign body (FB) in soft tissue 12/07/2017     Priority: Medium        Past Medical History:    No past medical history on file.    Past Surgical History:    No past surgical history on file.    Family History:    Family History   Problem Relation Age of Onset     Hypertension Father        Social History:  Marital Status:   [2]  Social History     Tobacco Use     Smoking status: Never     Smokeless tobacco: Never   Substance Use Topics     Alcohol use: Yes     Comment: Socially     Drug use: No        Medications:    valACYclovir (VALTREX) 1000 mg tablet          Review of Systems   Constitutional: Negative.    Respiratory: Negative.    Musculoskeletal: Negative.    Skin: Positive for rash.   Neurological: Negative.        Physical Exam   BP: 112/77  Pulse: 80  Temp: 98  F (36.7  C)  Resp: 10  SpO2: 99 %      Physical Exam  Constitutional:       General: He is not in acute distress.     Appearance: Normal  appearance. He is not ill-appearing, toxic-appearing or diaphoretic.   Skin:     General: Skin is warm and dry.      Findings: Laceration and rash present. Rash is crusting and vesicular.      Comments: There are (2) rashy patches, one left of center over the lumbar paraspinal muscles and 1 on the lateral aspect of the left thigh. Vesicular raised lesions following a dermatomal pattern in the left low back and on the lateral aspect of the left thigh.    Rash is nonpainful, does not tiburcio to pressure.   Neurological:      Mental Status: He is alert and oriented to person, place, and time.      Sensory: No sensory deficit.         ED Course                 Procedures                No results found for this or any previous visit (from the past 24 hour(s)).    Medications - No data to display    Assessments & Plan (with Medical Decision Making)     Presentation and physical exam are consistent with herpes zoster today.  Discussed that there may be residual pain after shingles resolves, herpetic pain.  May present to clinic for further evaluation if this is especially noticeable.  The patient appears well, no signs of systemic infection today.  No fevers.  Discussed that shingles is contagious until the lesions resolve.  Recommended keeping the rash areas covered and washing hands frequently.    Recommend presenting to clinic for urgent medical evaluation if you note that the lesions are worsening or spreading, if you note lesions on your face, especially around the eyes or ears.  Recommend practicing good handwashing, especially after touching the lesions.  May use Dove or Aveeno body wash.  Recommend lukewarm showers and pat yourself dry to avoid rubbing the lesions.  Make sure you launder your own clothes and bath towels as these can spread the virus to others. May use topical gels such as hydrocortisone 1% cream and calamine lotion for itch relief.  May also take his over-the-counter Zyrtec once daily for relief of  itching.  Avoid scratching the lesions, avoid tight fitting close.  Leave the affected areas open to air is much as possible.  Present to the emergency department if you develop a fever of 102  F or higher, increased redness/swelling or yellowish discharge from the lesions or severely worsening pain.    May apply ice or heat to affected areas for 15 to 20-minute intervals for symptomatic relief of pain and discomfort.  Recommend ibuprofen and/or Tylenol for symptomatic relief of pain as needed.    I have reviewed the nursing notes.    I have reviewed the findings, diagnosis, plan and need for follow up with the patient.      Discharge Medication List as of 11/25/2022  1:07 PM      START taking these medications    Details   valACYclovir (VALTREX) 1000 mg tablet Take 1 tablet (1,000 mg) by mouth 3 times daily for 7 days, Disp-21 tablet, R-0, E-Prescribe             Final diagnoses:   Shingles       11/25/2022   [facility] EMERGENCY DEPT     Miguel Rodriguez PA-C  11/25/22 9605

## 2022-12-04 ENCOUNTER — OFFICE VISIT (OUTPATIENT)
Dept: URGENT CARE | Facility: URGENT CARE | Age: 37
End: 2022-12-04
Payer: COMMERCIAL

## 2022-12-04 VITALS
WEIGHT: 194 LBS | BODY MASS INDEX: 29.5 KG/M2 | DIASTOLIC BLOOD PRESSURE: 79 MMHG | OXYGEN SATURATION: 99 % | HEART RATE: 69 BPM | SYSTOLIC BLOOD PRESSURE: 118 MMHG | TEMPERATURE: 97.1 F
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
## 2022-12-04 PROCEDURE — 90715 TDAP VACCINE 7 YRS/> IM: CPT

## 2022-12-04 PROCEDURE — 99214 OFFICE O/P EST MOD 30 MIN: CPT | Mod: 25

## 2022-12-04 PROCEDURE — 90471 IMMUNIZATION ADMIN: CPT

## 2022-12-04 NOTE — PROGRESS NOTES
"URGENT CARE  Assessment & Plan   Assessment:   Jeramie De Jesus is a 36 year old male who's clinical presentation today is consistent with:   1. Dog bite, initial encounter  - amoxicillin-clavulanate (AUGMENTIN) 875-125 MG tablet; Take 1 tablet by mouth 2 times daily for 10 days  Dispense: 20 tablet; Refill: 0    No alarm signs or symptoms present   Differential Diagnoses for this patient's CC include    foreign body, infected /contaminated wound,   Ligamentous vs tendon pathology,    Plan:  Will treat patient's infected animal bite at this time with antibiotic therapy. No culture obtained due to lack of purulence to obtain. Encourage patient to continue to monitor symptoms of increased  worsening/spreading infection. encourage patient to closely follow up in 24 to 48 hours if there is no improvement, sooner if they experience increasing redness, swelling or pain.   Additionally educated patient on pain relief using ibuprofen/tylenol, elevation    No primary closure recommended or needed to open puncture site   Encourage keeping wound covered w/ gauze and tape        Patient  is  agreeable to treatment plan and state they will follow-up if symptoms do not improve and/or if symptoms worsen (see patient's AVS 'monitor for' section for specific patient instructions given and discussed regarding what to watch for and when to follow up)    Medications ordered are listed above, please see AVS for patient's specific and personalized discharge instructions given     VALERIE Correa [facility] URGENT CARE [place]      ______________________________________________________________________        Subjective  Subjective     HPI: Jeramie De Jesus \"Manuel\"} is a 36 year old  male who presents today for evaluation the following concerns:   Patient states that he was bit by a dog   Patient states he was bitten on the right hand, this occurred about \"two weeks ago\", 12/20/22  As far as any treatment prior to " coming to , he has treated the area with washed with soap and water, applied antibacterial ointment, and bandaged. Patient states he watched   It for couple weeks but seems like it is getting worse  The patient states he is experiencing the following symptoms : slight pain at site of bite, swelling at site of bite, redness and warmth, but patient denies any abscess or drainage from the site  he denies any  red streaks, bruising, deformity to the site, or fevers  The patient states the the bite area seems to be gradually worsening}.   Patient's tetanus status is 2017  Patient states he is not concerned about rabies as this is his dog.  Patient also states he is not worried about any retained foreign bodies.      No Known Allergies  Patient Active Problem List   Diagnosis     Foreign body (FB) in soft tissue       Review of Systems:  Pertinent review of systems as reflected in HPI, otherwise negative.     Objective  Objective    Physical Exam:  Vitals:    12/04/22 0906   BP: 118/79   Pulse: 69   Temp: 97.1  F (36.2  C)   TempSrc: Tympanic   SpO2: 99%   Weight: 88 kg (194 lb)      General:   alert and oriented, no acute distress, non=ill-appearing   Vital signs reviewed: afebrile and normotensive     Psy/mental status: pleasant   SKIN:   Right hand, volar aspect along palm on the radial side:   Partial-thickness puncture penetration lesion   Which measures 1 mm  With erythremia present, and  Inflammation,    but no  bruising, ecchymosis, puss, colored discharge, or red streaks present.  Increased tenderness/pain with palpation surrounding wound. No decreased range of motion with flexion, extension, inversion, and eversion} at thumb, hand or wrist site    Temperature warmer than  body temperature. Neurovascularity intact with pulse +2        I explained my diagnostic considerations and recommendations to the patient, who voiced understanding and agreement with the treatment plan.   All questions were answered.   We  discussed potential side effects, risks and benefits of any prescribed or recommended therapies, as well as expectations for response to treatments.  Please see AVS for any patient instructions & handouts given.   Patient was advised to contact the Nurse Care Line, their Primary Care provider, Urgent Care, or the Emergency Department if there are new or worsening symptoms, or call 911 for emergencies.        ______________________________________________________________________          Patient Instructions   Diagnosis animal bite   Today we did:  Cleaned and dressed wound   Tetanus-   Rabies prevention discussion  No closure d/t risk of infection     Plan:     Keep wound clean, monitor for worsening infection    Take antibiotics as directed   Monitor for:   1. Increased or spreading infection: increased redness, warmth or red streaks   2. Swelling   3. Increased drainage, pus drainage   4. Fever, chills, headache, swollen lymph nodes   5. Trouble moving or strength in that extremity         Cat Bite or Scratch   Cats can cause wounds with their teeth or claws.   Cat bites or scratches can be serious because cats can transmit an array of bacteria and parasites.  Cats have long sharp teeth that can create deep puncture wounds, these deep wounds often don't appears infected at first.    An infection may occur in 12 to 24 hours, particularly if the hand is affected.     Rabies prevention  Rabies is a virus that can be carried in certain animals.   These can include domestic animals such as dogs and cats.   Wild animals such as skunks, raccoons, foxes, and bats can also carry rabies.   Pets fully vaccinated against rabies (2 shots) are at very low risk of infection    But because human rabies is almost always fatal, any biting pet should be confined for 10 days as an extra safety measure.   In general, if there is a risk for rabies, the following steps may need to be taken:    If someone's pet dog or cat has bitten you,  it should be kept in a secure area for the next 10 days to watch for signs of illness.     (If the pet owner won't allow this, contact your local animal control center.)     Ask the pet owner for vaccination records if available.     If the dog or cat becomes ill or dies during that time, contact your local animal control center at once so the animal may be tested for rabies.     If the pet stays healthy for the next 10 days, there is no danger of rabies in the animal or you.    If a stray pet bit you, contact your local animal control center. They can give information on capture, quarantine, and animal rabies testing.    If you can't find the animal that bit you in the next 2 days, and if rabies exists in your region, you may need to receive the rabies vaccine series.    All animal bites should be reported to the local animal control center.

## 2022-12-04 NOTE — PATIENT INSTRUCTIONS
Diagnosis animal bite   Today we did:  Cleaned and dressed wound   Tetanus-   Rabies prevention discussion  No closure d/t risk of infection     Plan:   Keep wound clean, monitor for worsening infection  Take antibiotics as directed   Monitor for:   Increased or spreading infection: increased redness, warmth or red streaks   Swelling   Increased drainage, pus drainage   Fever, chills, headache, swollen lymph nodes   Trouble moving or strength in that extremity         Cat Bite or Scratch   Cats can cause wounds with their teeth or claws.   Cat bites or scratches can be serious because cats can transmit an array of bacteria and parasites.  Cats have long sharp teeth that can create deep puncture wounds, these deep wounds often don't appears infected at first.    An infection may occur in 12 to 24 hours, particularly if the hand is affected.     Rabies prevention  Rabies is a virus that can be carried in certain animals.   These can include domestic animals such as dogs and cats.   Wild animals such as skunks, raccoons, foxes, and bats can also carry rabies.   Pets fully vaccinated against rabies (2 shots) are at very low risk of infection    But because human rabies is almost always fatal, any biting pet should be confined for 10 days as an extra safety measure.   In general, if there is a risk for rabies, the following steps may need to be taken:  If someone's pet dog or cat has bitten you, it should be kept in a secure area for the next 10 days to watch for signs of illness.   (If the pet owner won't allow this, contact your local animal control center.)   Ask the pet owner for vaccination records if available.   If the dog or cat becomes ill or dies during that time, contact your local animal control center at once so the animal may be tested for rabies.   If the pet stays healthy for the next 10 days, there is no danger of rabies in the animal or you.  If a stray pet bit you, contact your local animal control  center. They can give information on capture, quarantine, and animal rabies testing.  If you can't find the animal that bit you in the next 2 days, and if rabies exists in your region, you may need to receive the rabies vaccine series.  All animal bites should be reported to the local animal control center.

## 2023-04-15 ENCOUNTER — HEALTH MAINTENANCE LETTER (OUTPATIENT)
Age: 38
End: 2023-04-15

## 2024-01-06 ENCOUNTER — E-VISIT (OUTPATIENT)
Dept: URGENT CARE | Facility: CLINIC | Age: 39
End: 2024-01-06
Payer: COMMERCIAL

## 2024-01-06 DIAGNOSIS — H10.33 ACUTE BACTERIAL CONJUNCTIVITIS OF BOTH EYES: Primary | ICD-10-CM

## 2024-01-06 PROCEDURE — 99421 OL DIG E/M SVC 5-10 MIN: CPT | Performed by: FAMILY MEDICINE

## 2024-01-06 RX ORDER — POLYMYXIN B SULFATE AND TRIMETHOPRIM 1; 10000 MG/ML; [USP'U]/ML
SOLUTION OPHTHALMIC
Qty: 10 ML | Refills: 0 | Status: SHIPPED | OUTPATIENT
Start: 2024-01-06 | End: 2024-01-13

## 2024-01-07 NOTE — PATIENT INSTRUCTIONS
Thank you for choosing us for your care. I have placed an order for a prescription so that you can start treatment. If you continue to have eye pain despite medicine please go to an eye doctor for evaluation. View your full visit summary for details by clicking on the link below. Your pharmacist will able to address any questions you may have about the medication.     If you re not feeling better within 2-3 days, please schedule an appointment.  You can schedule an appointment right here in [facility], or call [phone]  If the visit is for the same symptoms as your eVisit, we ll refund the cost of your eVisit if seen within seven days.    Pinkeye: Care Instructions  Overview     Pinkeye is redness and swelling of the eye surface and the conjunctiva (the lining of the eyelid and the covering of the white part of the eye). Pinkeye is also called conjunctivitis. Pinkeye is often caused by infection with bacteria or a virus. Dry air, allergies, smoke, and chemicals are other common causes.  Pinkeye often gets better on its own in 7 to 10 days. Antibiotics only help if the pinkeye is caused by bacteria. Pinkeye caused by infection spreads easily. If an allergy or chemical is causing pinkeye, it will not go away unless you can avoid whatever is causing it.  Follow-up care is a key part of your treatment and safety. Be sure to make and go to all appointments, and call your doctor if you are having problems. It's also a good idea to know your test results and keep a list of the medicines you take.  How can you care for yourself at home?  Wash your hands often. Always wash them before and after you treat pinkeye or touch your eyes or face.  Use moist cotton or a clean, wet cloth to remove crust. Wipe from the inside corner of the eye to the outside. Use a clean part of the cloth for each wipe.  Put cold or warm wet cloths on your eye a few times a day if the eye hurts.  Do not wear contact lenses or eye makeup until the  "pinkeye is gone. Throw away any eye makeup you were using when you got pinkeye. Clean your contacts and storage case. If you wear disposable contacts, use a new pair when your eye has cleared and it is safe to wear contacts again.  If the doctor gave you antibiotic ointment or eyedrops, use them as directed. Use the medicine for as long as instructed, even if your eye starts looking better soon. Keep the bottle tip clean, and do not let it touch the eye area.  To put in eyedrops or ointment:  Tilt your head back, and pull your lower eyelid down with one finger.  Drop or squirt the medicine inside the lower lid.  Close your eye for 30 to 60 seconds to let the drops or ointment move around.  Do not touch the ointment or dropper tip to your eyelashes or any other surface.  Do not share towels, pillows, or washcloths while you have pinkeye.  When should you call for help?   Call your doctor now or seek immediate medical care if:    You have pain in your eye, not just irritation on the surface.     You have a change in vision or loss of vision.     You have an increase in discharge from the eye.     Your eye has not started to improve or begins to get worse within 48 hours after you start using antibiotics.     Pinkeye lasts longer than 7 days.   Watch closely for changes in your health, and be sure to contact your doctor if you have any problems.  Where can you learn more?  Go to https://www.[employer].net/patiented  Enter Y392 in the search box to learn more about \"Pinkeye: Care Instructions.\"  Current as of: June 6, 2023               Content Version: 13.8    9434-3808 [employer].   Care instructions adapted under license by your healthcare professional. If you have questions about a medical condition or this instruction, always ask your healthcare professional. [employer] disclaims any warranty or liability for your use of this information.      "

## 2024-01-13 ENCOUNTER — LAB (OUTPATIENT)
Dept: LAB | Facility: CLINIC | Age: 39
End: 2024-01-13
Payer: COMMERCIAL

## 2024-01-13 DIAGNOSIS — Z31.69 PRE-CONCEPTION COUNSELING: ICD-10-CM

## 2024-01-13 LAB
HBV SURFACE AG SERPL QL IA: NONREACTIVE
HCV AB SERPL QL IA: NONREACTIVE
HIV 1+2 AB+HIV1 P24 AG SERPL QL IA: NONREACTIVE

## 2024-01-13 PROCEDURE — 36415 COLL VENOUS BLD VENIPUNCTURE: CPT

## 2024-01-13 PROCEDURE — 87389 HIV-1 AG W/HIV-1&-2 AB AG IA: CPT

## 2024-01-13 PROCEDURE — 87340 HEPATITIS B SURFACE AG IA: CPT

## 2024-01-13 PROCEDURE — 86803 HEPATITIS C AB TEST: CPT

## 2024-06-16 ENCOUNTER — HEALTH MAINTENANCE LETTER (OUTPATIENT)
Age: 39
End: 2024-06-16

## 2024-12-26 ENCOUNTER — OFFICE VISIT (OUTPATIENT)
Dept: FAMILY MEDICINE | Facility: CLINIC | Age: 39
End: 2024-12-26
Payer: COMMERCIAL

## 2024-12-26 VITALS
BODY MASS INDEX: 29.86 KG/M2 | SYSTOLIC BLOOD PRESSURE: 120 MMHG | HEART RATE: 68 BPM | HEIGHT: 68 IN | DIASTOLIC BLOOD PRESSURE: 78 MMHG | RESPIRATION RATE: 16 BRPM | WEIGHT: 197 LBS | TEMPERATURE: 98 F | OXYGEN SATURATION: 97 %

## 2024-12-26 DIAGNOSIS — J30.2 SEASONAL ALLERGIC RHINITIS, UNSPECIFIED TRIGGER: ICD-10-CM

## 2024-12-26 DIAGNOSIS — Z11.59 NEED FOR HEPATITIS C SCREENING TEST: ICD-10-CM

## 2024-12-26 DIAGNOSIS — R06.83 SNORING: Primary | ICD-10-CM

## 2024-12-26 PROBLEM — M79.5 FOREIGN BODY (FB) IN SOFT TISSUE: Status: RESOLVED | Noted: 2017-12-07 | Resolved: 2024-12-26

## 2024-12-26 ASSESSMENT — PAIN SCALES - GENERAL: PAINLEVEL_OUTOF10: NO PAIN (0)

## 2024-12-26 NOTE — NURSING NOTE
Prior to immunization administration, verified patients identity using patient s name and date of birth. Please see Immunization Activity for additional information.     Screening Questionnaire for Adult Immunization    Are you sick today?   No   Do you have allergies to medications, food, a vaccine component or latex?   No   Have you ever had a serious reaction after receiving a vaccination?   No   Do you have a long-term health problem with heart, lung, kidney, or metabolic disease (e.g., diabetes), asthma, a blood disorder, no spleen, complement component deficiency, a cochlear implant, or a spinal fluid leak?  Are you on long-term aspirin therapy?   No   Do you have cancer, leukemia, HIV/AIDS, or any other immune system problem?   No   Do you have a parent, brother, or sister with an immune system problem?   No   In the past 3 months, have you taken medications that affect  your immune system, such as prednisone, other steroids, or anticancer drugs; drugs for the treatment of rheumatoid arthritis, Crohn s disease, or psoriasis; or have you had radiation treatments?   No   Have you had a seizure, or a brain or other nervous system problem?   No   During the past year, have you received a transfusion of blood or blood    products, or been given immune (gamma) globulin or antiviral drug?   No   For women: Are you pregnant or is there a chance you could become       pregnant during the next month?   No   Have you received any vaccinations in the past 4 weeks?   No     Immunization questionnaire answers were all negative.      Patient instructed to remain in clinic for 15 minutes afterwards, and to report any adverse reactions.     Screening performed by Coco Maldonado MA on 12/26/2024 at 5:19 PM.

## 2024-12-26 NOTE — PATIENT INSTRUCTIONS
Flonase nasal spray for inflammation use daily for 14 days   Antihistamine over the counter (zyrtec or Claritin)     Sleep med consult     Water intake , 140 oz a day while at work

## 2024-12-26 NOTE — PROGRESS NOTES
"  Assessment & Plan     Snoring  Discussed differentials including positioning, nasal congestion, allergies, sleep apnea, StopBang score 4. Nasal turbinates with erythema and uluva with mild erythema and edema. Reports nose can feel plugged at night and experiences mouth breathing. Would like to have sleep study to assess   - Adult Sleep Eval & Management  Referral    Seasonal allergic rhinitis, unspecified trigger  Nasal turbinates with erythema.Uluva with mild erythema and edema. Could be due to snoring. Also history of allergies. Recommend trying flonase nasal spray for at least 14 days to reduce inflammation. Antihistamine. Discussed medication use and side effects.   Consider referral to ENT , differentials including deviate septum, nasal polyps or other upper air way obstruction.   Feeling fatigue, noticing weight gain. no other signs of hypothyroidism.   Discussed weight gain, per chair review weight 160's-170's in [id number] and then 170-190's lbs.   Recommend lifestyle changes, monitoring, follow up if concerns.     Need for hepatitis C screening test  Reviewed, declined today's visit.   Schedule annual physicial     Received COVID and influenza vaccinations today. Handout provided.        Patient verbalizes understanding and is in agreement with the plan of care. All questions and concerns addressed.             BMI  Estimated body mass index is 30.18 kg/m  as calculated from the following:    Height as of this encounter: 1.721 m (5' 7.75\").    Weight as of this encounter: 89.4 kg (197 lb).   Weight management plan: Discussed healthy diet and exercise guidelines      FUTURE APPOINTMENTS:       - Follow up as needed if symptoms worsen or do not improve.       Shane Jackson is a 38 year old, presenting for the following health issues:  Sleep Issues (Snoring, mouth breathing.  Waking up not feeling rested with occasional headaches upon waking.)        12/26/2024     4:41 PM   Additional Questions " "  Roomed by Coco RAHMAN   Accompanied by None         12/26/2024     4:41 PM   Patient Reported Additional Medications   Patient reports taking the following new medications None     History of Present Illness       Reason for visit:  Excessive snoring  Symptom onset:  More than a month  Symptoms include:  Waking up with headaches, not feeling rested, dry throat/mouth  Symptom intensity:  Severe  Symptom progression:  Worsening  Had these symptoms before:  No  What makes it worse:  Alcohol makes it worse.   He is taking medications regularly.  Wife noticing snoring at night   Patient waking up with headaches he attributes to nights he is snoring more and not feeling rested.   Notices plugged nose feeling, breathing out of mouth     History of seasonal allergies in the spring. Takes zyrtec during this time.     Noticing weight gain, fatigue  Denies any changes in routine or environment. Denies any illness. No history of acid reflux or cough.   Nonsmoker.     Denies having deviated septum, no ear or sinus pain, denies cough, wheezing, shortness of breath,  acid reflux, constipation.          Review of Systems  Constitutional, HEENT, cardiovascular, pulmonary, gi and gu systems are negative, except as otherwise noted.      Objective    /78   Pulse 68   Temp 98  F (36.7  C) (Tympanic)   Resp 16   Ht 1.721 m (5' 7.75\")   Wt 89.4 kg (197 lb)   SpO2 97%   BMI 30.18 kg/m    Body mass index is 30.18 kg/m .  Physical Exam   GENERAL: alert and no distressHENT: head normocephalic and atraumatic  Eyes grossly normal to inspection,conjunctivae and sclerae normal. Bilateral Ear canals and TM's normal.    No rhinitis. nasal turbinates with erythema bilaterally, no drainage or edema.  Mucous membranes moist. Oropharynx is clear. Uvula midline and mild erythema and edema. Posterior oropharyngeal erythema.Tonsils: no tonsillar exudate or tonsillar abscesses. Sinus cavities nontender.   NECK: no adenopathy, no asymmetry, " masses, or scars  RESP: lungs clear to auscultation - no rales, rhonchi or wheezes  CV: regular rate and rhythm, normal S1 S2, no S3 or S4, no murmur, click or rub, no peripheral edema  MS: no gross musculoskeletal defects noted, no edema  NEURO: Normal strength and tone, mentation intact and speech normal  PSYCH: mentation appears normal, affect normal/bright            Signed Electronically by: Antonina Mott, APRN, CNP, DNP

## 2025-06-21 ENCOUNTER — HEALTH MAINTENANCE LETTER (OUTPATIENT)
Age: 40
End: 2025-06-21